# Patient Record
Sex: FEMALE | Race: BLACK OR AFRICAN AMERICAN | Employment: FULL TIME | URBAN - METROPOLITAN AREA
[De-identification: names, ages, dates, MRNs, and addresses within clinical notes are randomized per-mention and may not be internally consistent; named-entity substitution may affect disease eponyms.]

---

## 2019-11-19 ENCOUNTER — HOSPITAL ENCOUNTER (EMERGENCY)
Age: 29
Discharge: HOME | End: 2019-11-19
Attending: EMERGENCY MEDICINE
Payer: SELF-PAY

## 2019-11-19 VITALS
HEART RATE: 68 BPM | OXYGEN SATURATION: 99 % | RESPIRATION RATE: 18 BRPM | HEIGHT: 64 IN | SYSTOLIC BLOOD PRESSURE: 133 MMHG | WEIGHT: 190.7 LBS | BODY MASS INDEX: 32.56 KG/M2 | DIASTOLIC BLOOD PRESSURE: 86 MMHG | TEMPERATURE: 97.8 F

## 2019-11-19 DIAGNOSIS — R56.9 SEIZURE (HCC): Primary | ICD-10-CM

## 2019-11-19 PROCEDURE — 99283 EMERGENCY DEPT VISIT LOW MDM: CPT

## 2019-11-19 PROCEDURE — 99282 EMERGENCY DEPT VISIT SF MDM: CPT

## 2019-11-19 RX ORDER — LAMOTRIGINE 150 MG/1
150 TABLET ORAL DAILY
COMMUNITY

## 2019-11-19 NOTE — LETTER
PAT [facility] EMERGENCY DEPT 
[address] [address] 
[phone] Work/School Note Date: 11/19/2019 To Whom It May concern: 
 
Emma Wilson was seen and treated today in the emergency room by the following provider(s): 
Attending Provider: Yudy Layne DO Physician Assistant: Patel Wade. Emma Wilson may return to work tomorrow Sincerely, 
 
 
 
 
ROEL Nichole

## 2019-11-20 NOTE — ED NOTES
Pt arrived to ED via ambulatory with c/o \"I just got worked up and had a seizure and I decided not to go to work and I need a work note\" x3 hours ago. \"I don't want to have to go to work\". \"I am perfectly fine now\". Lungs clear. . Pt is in no acute distress. Will continue to monitor. See nursing assessment. Safety precautions in place; call light within reach. Emergency Department Nursing Plan of Care       The Nursing Plan of Care is developed from the Nursing assessment and Emergency Department Attending provider initial evaluation. The plan of care may be reviewed in the ED Provider note.     The Plan of Care was developed with the following considerations:   Patient / Family readiness to learn indicated by:verbalized understanding  Persons(s) to be included in education: patient  Barriers to Learning/Limitations:No    Signed     Alice Henderson RN    11/19/2019   9:36 PM

## 2019-11-20 NOTE — ED PROVIDER NOTES
EMERGENCY DEPARTMENT HISTORY AND PHYSICAL EXAM      Date: 11/19/2019  Patient Name: Salma Licona    History of Presenting Illness     HPI: Salma Licona is a 34 y.o. female with past medical history of seizures presents to the emergency room for a work note after she had a seizure earlier today. She reports that she has had petit mal seizures since having meningitis as a child. She says that she takes Lamictal twice a day and is compliant with her medications. She reports she took her last dose before coming to the emergency room. She reports that her seizures typically present with a few seconds of a blank stare with focal seizure activity in her right upper extremity and she reports this happened earlier today. She denies grand mall seizure, tongue trauma, incontinence, recent fever, UTI symptoms, among other associated symptoms. She reports that she has been under a lot of stress recently and sometimes this causes her to have a breakthrough seizure. Pertinent social history: Former smoker, occasional drinker, denies drug use    Pertinent surgical history: None    PCP: None    Current Outpatient Medications   Medication Sig Dispense Refill    lamoTRIgine (LAMICTAL) 150 mg tablet Take 150 mg by mouth daily. Past History     Past Medical History:  Past Medical History:   Diagnosis Date    Seizure [facility])        Past Surgical History:  History reviewed. No pertinent surgical history. Family History:  History reviewed. No pertinent family history. Social History:  Social History     Tobacco Use    Smoking status: Former Smoker    Smokeless tobacco: Never Used   Substance Use Topics    Alcohol use: Yes     Comment: sometimes    Drug use: Never       Allergies: Allergies   Allergen Reactions    Shellfish Derived Hives     Difficulty swallowing, redness,         Review of Systems   Review of Systems   Constitutional: Negative for chills and fever.    Respiratory: Negative for shortness of breath. Cardiovascular: Negative for chest pain. Gastrointestinal: Negative for nausea and vomiting. Genitourinary:        Denies pregnancy   Neurological: Positive for seizures. Negative for weakness, light-headedness, numbness and headaches. Physical Exam     Vitals:    11/19/19 2110 11/19/19 2115 11/19/19 2137   BP: (!) 135/94 133/86    Pulse: 68     Resp: 18     Temp: 97.8 °F (36.6 °C)     SpO2: 99%  99%   Weight: 86.5 kg (190 lb 11.2 oz)     Height: 5' 4\" (1.626 m)       Physical Exam   Constitutional: She is oriented to person, place, and time. She appears well-developed and well-nourished. No distress. HENT:   Head: Normocephalic and atraumatic. Mouth/Throat: Oropharynx is clear and moist.   Eyes: Pupils are equal, round, and reactive to light. EOM are normal.   Cardiovascular: Normal rate, regular rhythm and normal heart sounds. Pulmonary/Chest: Effort normal and breath sounds normal.   Neurological: She is alert and oriented to person, place, and time. Coordination normal.   Skin: Skin is warm and dry. She is not diaphoretic. Psychiatric: She has a normal mood and affect. Her behavior is normal. Judgment and thought content normal.   Nursing note and vitals reviewed. Diagnostic Study Results     Labs -   No results found for this or any previous visit (from the past 12 hour(s)). Radiologic Studies -   No orders to display     CT Results  (Last 48 hours)    None                Medical Decision Making   I am the first provider for this patient. I reviewed the vital signs, available nursing notes, past medical history, past surgical history, social history    ED Course and Progress notes:   Initial assessment performed. The patients presenting problems have been discussed, and they are in agreement with the care plan formulated and outlined with them. I have encouraged them to ask questions as they arise throughout their visit.          Procedures:  Procedures    Critical Care Time: none    Vital Signs-Reviewed the patient's vital signs. Vitals:    11/19/19 2110 11/19/19 2115 11/19/19 2137   BP: (!) 135/94 133/86    BP 1 Location: Right arm     BP Patient Position: At rest;Sitting     Pulse: 68     Resp: 18     Temp: 97.8 °F (36.6 °C)     SpO2: 99%  99%   Weight: 86.5 kg (190 lb 11.2 oz)     Height: 5' 4\" (1.626 m)         Medications Administered During ED Course  Medications - No data to display    Disposition:  D/c home    DISCHARGE NOTE:   I Counseled the patient on diagnosis and care plan. All available lab and imaging results have been reviewed by me and were discussed with the patient, including all incidental findings. The likelihood of other entities in the differential is insufficient to justify any further testing for them. This was explained to the patient. Patient agrees with plan and agrees to follow up with PCP as recommended, or return to the ED immediately if their symptoms worsen. All medications were reviewed with the patient. All of pt's questions and concerns were addressed. The patient was advised that new or worsening symptoms would require further evaluation and should prompt immediate return to the Emergency Department. Discharge instructions have been provided and explained to the patient, along with reasons to return to the ED. Patient voices understanding and is agreeable with the plan for discharge. Patient is ready to go home. Follow-up Information     Follow up With Specialties Details Why [address] [facility] [address]  Schedule an appointment as soon as possible for a visit To establish care with a primary care provider and follow-up for above diagnosis for today's visit.  [address] [facility], [address] [place] [id number] [phone]    [facility] EMERGENCY DEPT Emergency Medicine Go to If symptoms worsen Annalise David          Current Discharge Medication List          Provider Notes (Medical Decision Making):   DDx: Absence seizure, grand mal seizure, low concern for meningitis      Diagnosis     Clinical Impression:   1. Seizure [facility])        Please note that this dictation was completed with [employer], the computer voice recognition software. Quite often unanticipated grammatical, syntax, homophones, and other interpretive errors are inadvertently transcribed by the computer software. Please disregard these errors. Please excuse any errors that have escaped final proofreading. This note will not be viewable in [address]

## 2019-11-20 NOTE — ED TRIAGE NOTES
Pt comes in reporting seizure-like activity about 2 hours ago. Pt reports being compliant with seizure medications. She says she hasn't has seizure activity \"in years\" and think she just \"got worked up with stress\". Pt is missing work today and needs a note.

## 2019-11-20 NOTE — DISCHARGE INSTRUCTIONS
Patient Education        Seizure: Care Instructions  Your Care Instructions    Seizures are caused by abnormal patterns of electrical signals in the brain. They are different for each person. Seizures can affect movement, speech, vision, or awareness. Some people have only slight shaking of a hand and do not pass out. Other people may pass out and have violent shaking of the whole body. Some people appear to stare into space. They are awake, but they can't respond normally. Later, they may not remember what happened. You may need tests to identify the type and cause of the seizures. A seizure may occur only once, or you may have them more than one time. Taking medicines as directed and following up with your doctor may help keep you from having more seizures. The doctor has checked you carefully, but problems can develop later. If you notice any problems or new symptoms, get medical treatment right away. Follow-up care is a key part of your treatment and safety. Be sure to make and go to all appointments, and call your doctor if you are having problems. It's also a good idea to know your test results and keep a list of the medicines you take. How can you care for yourself at home? · Be safe with medicines. Take your medicines exactly as prescribed. Call your doctor if you think you are having a problem with your medicine. · Do not do any activity that could be dangerous to you or others until your doctor says it is safe to do so. For example, do not drive a car, operate machinery, swim, or climb ladders. · Be sure that anyone treating you for any health problem knows that you have had a seizure and what medicines you are taking for it. · Identify and avoid things that may make you more likely to have a seizure. These may include lack of sleep, alcohol or drug use, stress, or not eating. · Make sure you go to your follow-up appointment. When should you call for help?   Call 911 anytime you think you may need emergency care. For example, call if:    · You have another seizure.     · You have more than one seizure in 24 hours.     · You have new symptoms, such as trouble walking, speaking, or thinking clearly.    Call your doctor now or seek immediate medical care if:    · You are not acting normally.    Watch closely for changes in your health, and be sure to contact your doctor if you have any problems. Where can you learn more? Go to http://naren-lisset.info/. Enter W928 in the search box to learn more about \"Seizure: Care Instructions. \"  Current as of: March 28, 2019  Content Version: 12.2  © 0031-5266 [employer]. Care instructions adapted under license by [employer] (which disclaims liability or warranty for this information). If you have questions about a medical condition or this instruction, always ask your healthcare professional. [address] any warranty or liability for your use of this information.

## 2019-11-20 NOTE — ED NOTES
Veronica Hand at bedside reviewing patient's discharge instructions and reviewing medications. Patient ambulatory home with self. Patient in no apparent distress.

## 2021-09-13 ENCOUNTER — HOSPITAL ENCOUNTER (OUTPATIENT)
Dept: GENERAL RADIOLOGY | Age: 31
Discharge: HOME | End: 2021-09-13
Payer: MEDICAID

## 2021-09-13 ENCOUNTER — TRANSCRIBE ORDER (OUTPATIENT)
Dept: REGISTRATION | Age: 31
End: 2021-09-13

## 2021-09-13 DIAGNOSIS — R05.9 COUGH: Primary | ICD-10-CM

## 2021-09-13 DIAGNOSIS — R05.9 COUGH: ICD-10-CM

## 2021-09-13 PROCEDURE — 71046 X-RAY EXAM CHEST 2 VIEWS: CPT
